# Patient Record
Sex: FEMALE | Race: WHITE | NOT HISPANIC OR LATINO | URBAN - METROPOLITAN AREA
[De-identification: names, ages, dates, MRNs, and addresses within clinical notes are randomized per-mention and may not be internally consistent; named-entity substitution may affect disease eponyms.]

---

## 2025-03-30 ENCOUNTER — OFFICE VISIT (OUTPATIENT)
Dept: URGENT CARE | Age: 60
End: 2025-03-30
Payer: COMMERCIAL

## 2025-03-30 VITALS
SYSTOLIC BLOOD PRESSURE: 150 MMHG | DIASTOLIC BLOOD PRESSURE: 87 MMHG | HEIGHT: 67 IN | TEMPERATURE: 97.9 F | BODY MASS INDEX: 21.35 KG/M2 | WEIGHT: 136 LBS | HEART RATE: 95 BPM | OXYGEN SATURATION: 98 % | RESPIRATION RATE: 14 BRPM

## 2025-03-30 DIAGNOSIS — S61.412A LACERATION OF LEFT HAND WITHOUT FOREIGN BODY, INITIAL ENCOUNTER: Primary | ICD-10-CM

## 2025-03-30 PROCEDURE — 99214 OFFICE O/P EST MOD 30 MIN: CPT | Performed by: REGISTERED NURSE

## 2025-03-30 PROCEDURE — 12002 RPR S/N/AX/GEN/TRNK2.6-7.5CM: CPT | Performed by: REGISTERED NURSE

## 2025-03-30 PROCEDURE — 3008F BODY MASS INDEX DOCD: CPT | Performed by: REGISTERED NURSE

## 2025-03-30 PROCEDURE — 90715 TDAP VACCINE 7 YRS/> IM: CPT | Performed by: REGISTERED NURSE

## 2025-03-30 PROCEDURE — 90471 IMMUNIZATION ADMIN: CPT | Performed by: REGISTERED NURSE

## 2025-03-30 RX ORDER — FLUOROURACIL 50 MG/G
CREAM TOPICAL
COMMUNITY

## 2025-03-30 RX ORDER — CEPHALEXIN 500 MG/1
500 CAPSULE ORAL 4 TIMES DAILY
Qty: 28 CAPSULE | Refills: 0 | Status: SHIPPED | OUTPATIENT
Start: 2025-03-30 | End: 2025-04-06

## 2025-03-30 RX ORDER — ROSUVASTATIN CALCIUM 5 MG/1
5 TABLET, COATED ORAL NIGHTLY
COMMUNITY

## 2025-03-30 RX ORDER — CEPHALEXIN 500 MG/1
500 CAPSULE ORAL 4 TIMES DAILY
Qty: 28 CAPSULE | Refills: 0 | Status: SHIPPED | OUTPATIENT
Start: 2025-03-30 | End: 2025-03-30

## 2025-03-30 RX ORDER — VIT C/E/ZN/COPPR/LUTEIN/ZEAXAN 250MG-90MG
1000 CAPSULE ORAL
COMMUNITY

## 2025-03-30 RX ORDER — AMLODIPINE BESYLATE 5 MG/1
5 TABLET ORAL DAILY
COMMUNITY

## 2025-03-30 RX ORDER — VALSARTAN 80 MG/1
80 TABLET ORAL DAILY
COMMUNITY

## 2025-03-30 RX ORDER — SIROLIMUS 0.5 MG/1
TABLET, FILM COATED ORAL
COMMUNITY
Start: 2024-10-21

## 2025-03-30 RX ORDER — LATANOPROST 50 UG/ML
1 SOLUTION/ DROPS OPHTHALMIC
COMMUNITY

## 2025-03-30 RX ORDER — FUROSEMIDE 20 MG/1
1 TABLET ORAL DAILY PRN
COMMUNITY
Start: 2024-08-16

## 2025-03-30 RX ORDER — SODIUM BICARBONATE 650 MG/1
1 TABLET ORAL 2 TIMES DAILY
COMMUNITY
Start: 2024-07-17

## 2025-03-30 RX ORDER — CYCLOSPORINE 25 MG/1
CAPSULE, LIQUID FILLED ORAL
COMMUNITY

## 2025-03-30 RX ORDER — TRETINOIN 0.5 MG/G
CREAM TOPICAL
COMMUNITY

## 2025-03-30 RX ORDER — PREDNISONE 5 MG/1
1.5 TABLET ORAL DAILY
COMMUNITY
Start: 2014-05-30

## 2025-03-30 ASSESSMENT — ENCOUNTER SYMPTOMS: WOUND: 1

## 2025-03-30 NOTE — PROGRESS NOTES
"Subjective   Patient ID: Yamilet Espana is a 59 y.o. female. They present today with a chief complaint of Laceration (Patient was doing yard work and a stick got caught in her skin and brought some skin back ).    History of Present Illness    History provided by:  Patient  Laceration  Location:  Hand  Hand laceration location:  Dorsum of L hand  Length:  5cm  Depth:  Cutaneous  Quality: avulsion    Bleeding: controlled    Time since incident:  30 minutes  Injury mechanism: tree branch.  Tetanus status:  Out of date      Past Medical History  Allergies as of 03/30/2025    (No Known Allergies)       (Not in a hospital admission)       Past Medical History:   Diagnosis Date    Kidney transplant status ([facility])     S/P kidney transplant    Personal history of other diseases of the respiratory system 01/07/2015    History of acute sinusitis    Personal history of other diseases of the respiratory system 04/05/2015    History of acute bronchitis    Sensorineural hearing loss, bilateral 04/05/2022    Asymmetrical sensorineural hearing loss       Past Surgical History:   Procedure Laterality Date    TUBAL LIGATION  01/07/2015    Tubal Ligation            Review of Systems  Review of Systems   Skin:  Positive for wound (lacerationt to the top of left hand).                                  Objective    Vitals:    03/30/25 1600   BP: 150/87   BP Location: Left arm   Patient Position: Sitting   BP Cuff Size: Adult   Pulse: 95   Resp: 14   Temp: 36.6 °C (97.9 °F)   TempSrc: Oral   SpO2: 98%   Weight: 61.7 kg (136 lb)   Height: 1.702 m (5' 7\")     No LMP recorded (lmp unknown).    Physical Exam  Vitals and nursing note reviewed.   Musculoskeletal:      Left hand: Swelling, laceration and tenderness present. Normal range of motion.        Hands:          Laceration Repair    Date/Time: 3/30/2025 4:51 PM    Performed by: Crystal L Severino, APRN-CNP  Authorized by: Crystal L Severino, APRN-CNP    Consent:     Consent obtained:  " Verbal    Risks discussed:  Infection and pain    Alternatives discussed:  No treatment and delayed treatment  Universal protocol:     Patient identity confirmed:  Verbally with patient  Anesthesia:     Anesthesia method:  Local infiltration    Local anesthetic:  Lidocaine 1% w/o epi  Laceration details:     Location:  Hand    Hand location:  L hand, dorsum    Length (cm):  5    Depth (mm):  2  Pre-procedure details:     Preparation:  Patient was prepped and draped in usual sterile fashion  Exploration:     Hemostasis achieved with:  Direct pressure    Contaminated: yes    Treatment:     Area cleansed with:  Karlos    Amount of cleaning:  Standard    Debridement:  None  Skin repair:     Repair method:  Sutures    Suture material:  Chromic gut    Suture technique:  Simple interrupted    Number of sutures:  10  Approximation:     Approximation:  Close  Repair type:     Repair type:  Simple  Post-procedure details:     Dressing:  Antibiotic ointment and bulky dressing    Procedure completion:  Tolerated well, no immediate complications      Point of Care Test & Imaging Results from this visit  No results found for this visit on 03/30/25.   Imaging  No results found.    Cardiology, Vascular, and Other Imaging  No other imaging results found for the past 2 days      Diagnostic study results (if any) were reviewed by Crystal L Severino, APRN-CNP.    Assessment/Plan   Allergies, medications, history, and pertinent labs/EKGs/Imaging reviewed by Crystal L Severino, APRN-CNP.     Medical Decision Making  59-year-old female with history of CKD stage IV, anemia presents with laceration to top of left hand.  States she was outside gardening when she hit her hand on a branch causing a laceration.  Patient states she is not UTD with her tetanus vaccine.  Skin assessment and laceration repair as above.  Patient was given her Tdap and is discharged home with Rx for antibiotics.    Orders and Diagnoses  Diagnoses and all orders  for this visit:  Laceration of left hand without foreign body, initial encounter  -     Tdap vaccine, age 7 years and older  -     cephalexin (Keflex) 500 mg capsule; Take 1 capsule (500 mg) by mouth 4 times a day for 7 days.  Other orders  -     Laceration Repair  Sutures need to come out in 7-10 days  Keep wound clean and dry  Antibiotic ointment and band-aid      Medical Admin Record      Patient disposition: Home    Electronically signed by Crystal L Severino, APRN-CNP  4:55 PM

## 2025-04-08 ENCOUNTER — OFFICE VISIT (OUTPATIENT)
Dept: URGENT CARE | Age: 60
End: 2025-04-08
Payer: COMMERCIAL

## 2025-04-08 VITALS
WEIGHT: 135 LBS | OXYGEN SATURATION: 96 % | TEMPERATURE: 97.6 F | HEART RATE: 85 BPM | SYSTOLIC BLOOD PRESSURE: 160 MMHG | BODY MASS INDEX: 21.14 KG/M2 | DIASTOLIC BLOOD PRESSURE: 83 MMHG | RESPIRATION RATE: 20 BRPM

## 2025-04-08 DIAGNOSIS — Z48.02 VISIT FOR SUTURE REMOVAL: Primary | ICD-10-CM

## 2025-04-08 ASSESSMENT — ENCOUNTER SYMPTOMS: WOUND: 1

## 2025-04-08 NOTE — PROGRESS NOTES
Subjective   Patient ID: Yamilet Espana is a 59 y.o. female. They present today with a chief complaint of Suture / Staple Removal (Left hand; 10 stitches on 3/30).    History of Present Illness    History provided by:  Patient  Suture / Staple Removal   The sutures were placed 7 to 10 days ago. There has been no treatment since the wound repair. There has been no drainage from the wound. There is no redness present. There is no swelling present. The pain has improved. She has no difficulty moving the affected extremity or digit.       Past Medical History  Allergies as of 04/08/2025    (No Known Allergies)       (Not in a hospital admission)       Past Medical History:   Diagnosis Date    Kidney transplant status     S/P kidney transplant    Personal history of other diseases of the respiratory system 01/07/2015    History of acute sinusitis    Personal history of other diseases of the respiratory system 04/05/2015    History of acute bronchitis    Sensorineural hearing loss, bilateral 04/05/2022    Asymmetrical sensorineural hearing loss       Past Surgical History:   Procedure Laterality Date    TUBAL LIGATION  01/07/2015    Tubal Ligation        reports that she has never smoked. She has never used smokeless tobacco. She reports current alcohol use. She reports that she does not use drugs.    Review of Systems  Review of Systems   Skin:  Positive for wound (sutures to top of left hand).   All other systems reviewed and are negative.                                 Objective    Vitals:    04/08/25 1627   BP: 160/83   BP Location: Left arm   Patient Position: Sitting   BP Cuff Size: Adult   Pulse: 85   Resp: 20   Temp: 36.4 °C (97.6 °F)   TempSrc: Oral   SpO2: 96%   Weight: 61.2 kg (135 lb)     No LMP recorded (lmp unknown).    Physical Exam  Vitals and nursing note reviewed.   Skin:     General: Skin is warm and dry.      Findings: Bruising and wound present.                Suture Removal    Date/Time: 4/8/2025 4:55  PM    Performed by: Crystal L Severino, APRN-CNP  Authorized by: Crystal L Severino, APRN-CNP    Consent:     Consent obtained:  Verbal    Consent given by:  Patient    Risks, benefits, and alternatives were discussed: yes      Risks discussed:  Bleeding, pain and wound separation    Alternatives discussed:  No treatment and delayed treatment  Universal protocol:     Patient identity confirmed:  Verbally with patient  Location:     Location:  Upper extremity    Upper extremity location:  Hand    Hand location:  L hand  Procedure details:     Wound appearance:  No signs of infection, good wound healing and clean    Number of sutures removed:  10  Post-procedure details:     Post-removal:  Steri-Strips applied and Band-Aid applied    Procedure completion:  Tolerated well, no immediate complications      Point of Care Test & Imaging Results from this visit  No results found for this visit on 04/08/25.   Imaging  No results found.    Cardiology, Vascular, and Other Imaging  No other imaging results found for the past 2 days      Diagnostic study results (if any) were reviewed by Crystal L Severino, APRN-CNP.    Assessment/Plan   Allergies, medications, history, and pertinent labs/EKGs/Imaging reviewed by Crystal L Severino, APRN-CNP.     Medical Decision Making  59-year-old female presents for suture removal to top of her left hand.  Patient states she had the sutures placed 10 days ago, has had no issues with drainage or separation.  Patient states she did finish all of her antibiotics.  Sutures are removed as above, Steri-Strips are placed with a adhesive bandage to cover.    Orders and Diagnoses  Diagnoses and all orders for this visit:  Visit for suture removal  -     Suture removal; Future  Keep wound clean and dry  Antibiotic ointment and band-aid      Medical Admin Record      Patient disposition: Home    Electronically signed by Crystal L Severino, APRN-CNP  4:51 PM